# Patient Record
Sex: MALE | Race: WHITE
[De-identification: names, ages, dates, MRNs, and addresses within clinical notes are randomized per-mention and may not be internally consistent; named-entity substitution may affect disease eponyms.]

---

## 2019-01-31 ENCOUNTER — HOSPITAL ENCOUNTER (EMERGENCY)
Dept: HOSPITAL 97 - ER | Age: 34
Discharge: HOME | End: 2019-01-31
Payer: COMMERCIAL

## 2019-01-31 DIAGNOSIS — F17.210: ICD-10-CM

## 2019-01-31 DIAGNOSIS — S39.012A: Primary | ICD-10-CM

## 2019-01-31 PROCEDURE — 99283 EMERGENCY DEPT VISIT LOW MDM: CPT

## 2019-01-31 NOTE — EDPHYS
Physician Documentation                                                                           

 [facility]                                                                

Name: Berto Farley                                                                               

Age: 33 yrs                                                                                       

Sex: Male                                                                                         

: 1985                                                                                   

MRN: [id number]                                                                                   

Arrival Date: 2019                                                                          

Time: 18:42                                                                                       

Account#: [id number]                                                                            

Bed 15                                                                                            

Private MD: None, None                                                                            

ED Physician Jameson Haro                                                                       

HPI:                                                                                              

                                                                                             

20:04 This 33 yrs old  Male presents to ER via Ambulatory with complaints of Back    jr8 

      Pain.                                                                                       

20:04 The patient presents with pain that is chronic. The symptoms are located in the low     jr8 

      back. Onset: The symptoms/episode began/occurred gradually, 1 week(s) ago, and became       

      worse and became persistent. The pain does not radiate. Associated signs and symptoms:      

      The patient has no apparent associated signs or symptoms. Modifying factors: The            

      patient symptoms are alleviated by rest, the patient symptoms are aggravated by             

      bending, lifting, movement. Severity of symptoms: At their worst the symptoms were          

      moderate, in the emergency department the symptoms are unchanged. The patient has           

      experienced similar episodes in the past, a few times. The patient has not recently         

      seen a physician. History of Chronic back pain. Has occasional flare up's. Stated that      

      he had been bending a lot for work and started to have a flare up. Has not had any          

      relief with OTC medicine. Denies numbness, tingling, weakness, abdominal pain, bowel or     

      bladder dysfunction .                                                                       

                                                                                                  

Historical:                                                                                       

- Allergies:                                                                                      

19:12 No Known Allergies;                                                                     ph  

- Home Meds:                                                                                      

19:12 None [Active];                                                                          ph  

- PMHx:                                                                                           

19:12 None;                                                                                   ph  

- PSHx:                                                                                           

19:12 None;                                                                                   ph  

                                                                                                  

- Immunization history:: Adult Immunizations not up to date.                                      

- Social history:: Smoking status: Patient uses tobacco products, smokes one pack                 

  cigarettes per day.                                                                             

- Ebola Screening: : No symptoms or risks identified at this time.                                

                                                                                                  

                                                                                                  

ROS:                                                                                              

20:04 Eyes: Negative for injury, pain, redness, and discharge, ENT: Negative for injury,      jr8 

      pain, and discharge, Neck: Negative for injury, pain, and swelling, Cardiovascular:         

      Negative for chest pain, palpitations, and edema, Respiratory: Negative for shortness       

      of breath, cough, wheezing, and pleuritic chest pain, Abdomen/GI: Negative for              

      abdominal pain, nausea, vomiting, diarrhea, and constipation, MS/Extremity: Negative        

      for injury and deformity, Skin: Negative for injury, rash, and discoloration, Neuro:        

      Negative for headache, weakness, numbness, tingling, and seizure.                           

20:04 Back: Positive for pain at rest, pain with movement, of the low back area, Negative for     

      radiated pain.                                                                              

                                                                                                  

Exam:                                                                                             

20:04 Eyes:  Pupils equal round and reactive to light, extra-ocular motions intact.  Lids and jr8 

      lashes normal.  Conjunctiva and sclera are non-icteric and not injected.  Cornea within     

      normal limits.  Periorbital areas with no swelling, redness, or edema. ENT:  Nares          

      patent. No nasal discharge, no septal abnormalities noted.  Tympanic membranes are          

      normal and external auditory canals are clear.  Oropharynx with no redness, swelling,       

      or masses, exudates, or evidence of obstruction, uvula midline.  Mucous membranes           

      moist. Neck:  Trachea midline, no thyromegaly or masses palpated, and no cervical           

      lymphadenopathy.  Supple, full range of motion without nuchal rigidity, or vertebral        

      point tenderness.  No Meningismus. Cardiovascular:  Regular rate and rhythm with a          

      normal S1 and S2.  No gallops, murmurs, or rubs.  Normal PMI, no JVD.  No pulse             

      deficits. Respiratory:  Lungs have equal breath sounds bilaterally, clear to                

      auscultation and percussion.  No rales, rhonchi or wheezes noted.  No increased work of     

      breathing, no retractions or nasal flaring. Abdomen/GI:  Soft, non-tender, with normal      

      bowel sounds.  No distension or tympany.  No guarding or rebound.  No evidence of           

      tenderness throughout. Skin:  Warm, dry with normal turgor.  Normal color with no           

      rashes, no lesions, and no evidence of cellulitis. MS/ Extremity:  Pulses equal, no         

      cyanosis.  Neurovascular intact.  Full, normal range of motion. Neuro:  Awake and           

      alert, GCS 15, oriented to person, place, time, and situation.  Cranial nerves II-XII       

      grossly intact.  Motor strength 5/5 in all extremities.  Sensory grossly intact.            

      Cerebellar exam normal.  Normal gait.                                                       

20:04 Back: pain, that is moderate, of the  low back area, ROM is painful, normal spinal          

      alignment noted, CVA tenderness, is absent, muscle spasm, is appreciated in the left        

      low back and right low back.                                                                

                                                                                                  

Vital Signs:                                                                                      

19:12  / 92; Pulse 110; Resp 18; Temp 98.4; Pulse Ox 96% on R/A; Weight 117.93 kg;      ph  

      Height 5 ft. 11 in. (180.34 cm); Pain 8/10;                                                 

20:00  / 89; Pulse 106; Resp 19 S; Pulse Ox 96% on R/A;                                 cc3 

19:12 Body Mass Index 36.26 (117.93 kg, 180.34 cm)                                            ph  

                                                                                                  

MDM:                                                                                              

19:58 Patient medically screened.                                                             jr8 

19:58 Data reviewed: vital signs, nurses notes, and as a result, I will discharge patient.    jr8 

      Data interpreted: Pulse oximetry: on room air is 96 %. Interpretation: normal.              

      Counseling: I had a detailed discussion with the patient and/or guardian regarding: the     

      historical points, exam findings, and any diagnostic results supporting the                 

      discharge/admit diagnosis, the need for outpatient follow up, a family practitioner, to     

      return to the emergency department if symptoms worsen or persist or if there are any        

      questions or concerns that arise at home.                                                   

                                                                                                  

Administered Medications:                                                                         

No medications were administered                                                                  

                                                                                                  

                                                                                                  

Disposition:                                                                                      

                                                                                             

06:43 Co-signature as Attending Physician, Jameson Haro MD I agree with the assessment and   kdr 

      plan of care.                                                                               

                                                                                                  

Disposition:                                                                                      

19 19:59 Discharged to Home. Impression: Low back pain, Strain of muscle, fascia and        

  tendon of lower back.                                                                           

- Condition is Stable.                                                                            

- Discharge Instructions: Back Pain, Adult, Chronic Back Pain, Musculoskeletal Pain,              

  Back Exercises, Easy-to-Read, Heat Therapy.                                                     

- Prescriptions for Ibuprofen 800 mg Oral Tablet - take 1 tablet by ORAL route every 12           

  hours As needed take with food; 20 tablet. Cyclobenzaprine 10 mg Oral Tablet - take 1           

  tablet by ORAL route every 8 hours As needed; 30 tablet. Medrol (Asm) 4 mg Oral                 

  Tablets, Dose Pack - take 1 tablet by ORAL route as directed - follow package                   

  instructions; 1 packet.                                                                         

- Work release form, Medication Reconciliation Form, Thank You Letter, Antibiotic                 

  Education, Prescription Opioid Use form.                                                        

- Follow up: Private Physician; When: 5 - 6 days; Reason: Recheck today's complaints,             

  Continuance of care, Re-evaluation by your physician.                                           

- Problem is new.                                                                                 

- Symptoms have improved.                                                                         

                                                                                                  

                                                                                                  

                                                                                                  

Signatures:                                                                                       

Jameson Haro MD MD   [facility]                                                  

Shahid Amado PA PA   jr8                                                  

Magy Barrera RN                      RN   ph                                                   

Jody Johnston                             cc3                                                  

                                                                                                  

Corrections: (The following items were deleted from the chart)                                    

                                                                                             

20:16 19:59 2019 19:59 Discharged to Home. Impression: Low back pain; Strain of muscle, cc3 

      fascia and tendon of lower back. Condition is Stable. Forms are Medication                  

      Reconciliation Form, Thank You Letter, Antibiotic Education, Prescription Opioid Use.       

      Follow up: Private Physician; When: 5 - 6 days; Reason: Recheck today's complaints,         

      Continuance of care, Re-evaluation by your physician. Problem is new. Symptoms have         

      improved. jr8                                                                               

                                                                                                  

**************************************************************************************************

## 2022-12-12 ENCOUNTER — HOSPITAL ENCOUNTER (EMERGENCY)
Dept: HOSPITAL 97 - ER | Age: 37
Discharge: HOME | End: 2022-12-12
Payer: COMMERCIAL

## 2022-12-12 VITALS — TEMPERATURE: 98.6 F

## 2022-12-12 VITALS — OXYGEN SATURATION: 100 % | DIASTOLIC BLOOD PRESSURE: 89 MMHG | SYSTOLIC BLOOD PRESSURE: 156 MMHG

## 2022-12-12 DIAGNOSIS — M54.12: Primary | ICD-10-CM

## 2022-12-12 DIAGNOSIS — F17.210: ICD-10-CM

## 2022-12-12 LAB
BUN BLD-MCNC: 11 MG/DL (ref 7–18)
GLUCOSE SERPLBLD-MCNC: 107 MG/DL (ref 74–106)
HCT VFR BLD CALC: 38.5 % (ref 39.6–49)
LYMPHOCYTES # SPEC AUTO: 2.3 K/UL (ref 0.7–4.9)
MCV RBC: 88.1 FL (ref 80–100)
PMV BLD: 8 FL (ref 7.6–11.3)
POTASSIUM SERPL-SCNC: 3.8 MMOL/L (ref 3.5–5.1)
RBC # BLD: 4.37 M/UL (ref 4.33–5.43)
TROPONIN I SERPL HS-MCNC: 10.2 PG/ML (ref ?–58.9)

## 2022-12-12 PROCEDURE — 96374 THER/PROPH/DIAG INJ IV PUSH: CPT

## 2022-12-12 PROCEDURE — 93005 ELECTROCARDIOGRAM TRACING: CPT

## 2022-12-12 PROCEDURE — 74175 CTA ABDOMEN W/CONTRAST: CPT

## 2022-12-12 PROCEDURE — 71045 X-RAY EXAM CHEST 1 VIEW: CPT

## 2022-12-12 PROCEDURE — 99285 EMERGENCY DEPT VISIT HI MDM: CPT

## 2022-12-12 PROCEDURE — 96375 TX/PRO/DX INJ NEW DRUG ADDON: CPT

## 2022-12-12 PROCEDURE — 71275 CT ANGIOGRAPHY CHEST: CPT

## 2022-12-12 PROCEDURE — 85025 COMPLETE CBC W/AUTO DIFF WBC: CPT

## 2022-12-12 PROCEDURE — 84484 ASSAY OF TROPONIN QUANT: CPT

## 2022-12-12 PROCEDURE — 36415 COLL VENOUS BLD VENIPUNCTURE: CPT

## 2022-12-12 PROCEDURE — 80048 BASIC METABOLIC PNL TOTAL CA: CPT

## 2022-12-12 NOTE — RAD REPORT
EXAM DESCRIPTION:  RAD - Chest Single View - 12/12/2022 7:08 pm

 

CLINICAL HISTORY:  CHEST PAIN

 

COMPARISON:  CHEST PA AND LAT 2 VIEW dated 12/8/2009

 

FINDINGS:  Lines: None.

Lungs: No evidence of edema or pneumonia.

Pleural: No significant pleural effusions or pneumothorax.

Cardiac: The heart size is within normal limits.

Mediastinum: Within normal limits.

Bones: No acute fractures.

Other: None

 

IMPRESSION:  No acute cardiopulmonary disease.

## 2022-12-12 NOTE — ER
Nurse's Notes                                                                                     

 [facility]                                                                 

Name: Berto Farley                                                                               

Age: 37 yrs                                                                                       

Sex: Male                                                                                         

: 1985                                                                                   

MRN: [id number]                                                                                   

Arrival Date: 2022                                                                          

Time: 18:44                                                                                       

Account#: [id number]                                                                            

Bed IW1                                                                                           

Private MD:                                                                                       

Diagnosis: Chest pain, unspecified;Radiculopathy, cervical region                                 

                                                                                                  

Presentation:                                                                                     

                                                                                             

18:45 Chief complaint: Patient states: Woke up today - chest pain radiates to left arm.       ld1 

      Severe pain - took ibuprofen and acetaminophen this morning. Coronavirus screen: At         

      this time, the client does not indicate any symptoms associated with coronavirus-19.        

      Ebola Screen: No symptoms or risks identified at this time. Initial Sepsis Screen: Does     

      the patient meet any 2 criteria? No. Patient's initial sepsis screen is negative. Does      

      the patient have a suspected source of infection? No. Patient's initial sepsis screen       

      is negative. Risk Assessment: Do you want to hurt yourself or someone else? Patient         

      reports no desire to harm self or others. Onset of symptoms was 2022 at        

      18:46.                                                                                      

18:45 Method Of Arrival: Ambulatory                                                           ld1 

18:45 Acuity: LEWIS 3                                                                           ld1 

                                                                                                  

Triage Assessment:                                                                                

18:46 General: Appears in no apparent distress. uncomfortable, Behavior is calm, cooperative, ld1 

      appropriate for age. Pain: Complains of pain in left clavicle and anterior aspect of        

      left upper chest Pain radiates to left arm Pain currently is 10 out of 10 on a pain         

      scale. Quality of pain is described as sharp, shooting, throbbing. EENT: No signs           

      and/or symptoms were reported regarding the EENT system. Neuro: Level of Consciousness      

      is awake, alert, obeys commands, Oriented to person, place, time, situation.                

      Cardiovascular: Capillary refill < 3 seconds Patient's skin is warm and dry. Rhythm is      

      sinus rhythm. Respiratory: Airway is patent Respiratory effort is even, unlabored. GI:      

      Abdomen is round non-distended. : No signs and/or symptoms were reported regarding        

      the genitourinary system. Derm: No signs and/or symptoms reported regarding the             

      dermatologic system. Musculoskeletal: No signs and/or symptoms reported regarding the       

      musculoskeletal system.                                                                     

                                                                                                  

Historical:                                                                                       

- Allergies:                                                                                      

18:46 No Known Allergies;                                                                     ld1 

- Home Meds:                                                                                      

18:46 None [Active];                                                                          ld1 

- PMHx:                                                                                           

18:46 None;                                                                                   ld1 

- PSHx:                                                                                           

18:46 None;                                                                                   ld1 

                                                                                                  

- Immunization history:: Adult Immunizations up to date, Client reports receiving the             

  2nd dose of the Covid vaccine.                                                                  

- Social history:: Smoking status: Patient reports the use of cigarette tobacco                   

  products, smokes one-half pack cigarettes per day, Patient/guardian denies using                

  alcohol.                                                                                        

- Family history:: not pertinent.                                                                 

- Hospitalizations: : No recent hospitalization is reported.                                      

                                                                                                  

                                                                                                  

Screenin:56 Abuse screen: Denies threats or abuse. Denies injuries from another. Nutritional        ld1 

      screening: No deficits noted. Tuberculosis screening: No symptoms or risk factors           

      identified. Fall Risk No fall in past 12 months (0 pts).                                    

                                                                                                  

Assessment:                                                                                       

21:56 Reassessment: Patient appears in no apparent distress at this time. No changes from     ld1 

      previously documented assessment. Patient and/or family updated on plan of care and         

      expected duration. Pain level reassessed. Patient is alert, oriented x 3, equal             

      unlabored respirations, skin warm/dry/pink. See triage assessment.                          

                                                                                                  

Vital Signs:                                                                                      

18:45 Weight 124.74 kg; Height 5 ft. 11 in. (180.34 cm); Pain 10/10;                          ld1 

18:48  / 93; Pulse 82; Resp 18; Temp 98.6(O); Pulse Ox 98% on R/A;                      ld1 

21:56  / 89; Pulse 79; Resp 18; Pulse Ox 100% on R/A; Pain 3/10;                        ld1 

18:45 Body Mass Index 38.35 (124.74 kg, 180.34 cm)                                            ld1 

                                                                                                  

ED Course:                                                                                        

18:44 Patient arrived in ED.                                                                  rg4 

18:46 Triage completed.                                                                       ld1 

18:46 Arm band placed on right wrist. EKG completed in triage. Results shown to MD.           ld1 

19:10 XRAY Chest (1 view) In Process Unspecified.                                             EDMS

19:12 Samm Evangelista MD is Attending Physician.                                                rn  

19:31 Inserted saline lock: 20 gauge in right antecubital area, using aseptic technique.      db  

      Blood collected.                                                                            

20:58 CT Aorta for Dissection In Process Unspecified.                                         EDMS

21:56 Patient has correct armband on for positive identification. Call light in reach. Pulse  ld1 

      ox on. NIBP on. Door closed. Noise minimized.                                               

21:56 No provider procedures requiring assistance completed. IV discontinued, intact,         ld1 

      bleeding controlled, No redness/swelling at site. Patient maintains SpO2 saturation         

      greater than 95% on room air.                                                               

                                                                                                  

Administered Medications:                                                                         

21:55 Drug: Ketorolac 30 mg Route: IVP; Site: right antecubital;                              ld1 

21:56 Drug: Decadron - Dexamethasone 10 mg Route: IVP; Site: right antecubital;               ld1 

                                                                                                  

                                                                                                  

Medication:                                                                                       

21:56 VIS not applicable for this client.                                                     ld1 

                                                                                                  

Outcome:                                                                                          

21:37 Discharge ordered by MD.                                                                rn  

21:56 Discharged to home ambulatory, with family.                                             ld1 

21:56 Condition: stable                                                                           

21:56 Discharge instructions given to patient, family, Instructed on discharge instructions,      

      follow up and referral plans. medication usage, Demonstrated understanding of               

      instructions, follow-up care, medications, Prescriptions given X 3.                         

21:57 Patient left the ED.                                                                    ld1 

                                                                                                  

Signatures:                                                                                       

Dispatcher [employer]                           EDMS                                                 

Samm Evangelista MD MD rn Garcia, Rubi                                 rg4                                                  

Kyra Mcginnis RN                     RN   ld1                                                  

Justine Travis RN                    RN   db                                                   

                                                                                                  

**************************************************************************************************

## 2022-12-12 NOTE — EDPHYS
Physician Documentation                                                                           

 [facility]                                                                 

Name: Berto Farley                                                                               

Age: 37 yrs                                                                                       

Sex: Male                                                                                         

: 1985                                                                                   

MRN: [id number]                                                                                   

Arrival Date: 2022                                                                          

Time: 18:44                                                                                       

Account#: [id number]                                                                            

Bed IW1                                                                                           

Private MD:                                                                                       

ED Physician Samm Evangelista                                                                         

HPI:                                                                                              

                                                                                             

19:29 This 37 yrs old Black Male presents to ER via Ambulatory with complaints of Chest Pain, rn  

      Shoulder Pain.                                                                              

19:29 The patient or guardian reports chest pain that is located primarily in the anterior    rn  

      chest wall, left. The pain radiates to the left shoulder, the left scapula. Associated      

      signs and symptoms: Pertinent positives: None. Pertinent negatives: abdominal pain,         

      cough, diaphoresis, dizziness, headache, near syncope, palpitations, shortness of           

      breath, syncope, vomiting. The chest pain is described as aching. Duration: The patient     

      or guardian reports multiple episodes, that are intermittent. Modifying factors: The        

      symptoms are alleviated by NSAIDS, remaining still, the symptoms are aggravated by          

      movement, palpation of area. Severity of pain: At its worst the pain was moderate in        

      the emergency department the pain has improved. The patient has not experienced similar     

      symptoms in the past. The patient has not recently seen a physician. Pt reports left        

      chest pain, radiates to left shoulder and scapula. No fever. No trauma. No cough.           

      Reports improved with OTC meds. Present for last couple of days. No famhx of cardiac        

      problems at his age. NO abd pain. No vomiting. .                                            

                                                                                                  

Historical:                                                                                       

- Allergies:                                                                                      

18:46 No Known Allergies;                                                                     ld1 

- Home Meds:                                                                                      

18:46 None [Active];                                                                          ld1 

- PMHx:                                                                                           

18:46 None;                                                                                   ld1 

- PSHx:                                                                                           

18:46 None;                                                                                   ld1 

                                                                                                  

- Immunization history:: Adult Immunizations up to date, Client reports receiving the             

  2nd dose of the Covid vaccine.                                                                  

- Social history:: Smoking status: Patient reports the use of cigarette tobacco                   

  products, smokes one-half pack cigarettes per day, Patient/guardian denies using                

  alcohol.                                                                                        

- Family history:: not pertinent.                                                                 

- Hospitalizations: : No recent hospitalization is reported.                                      

                                                                                                  

                                                                                                  

ROS:                                                                                              

19:29 Constitutional: Negative for fever, chills, and weight loss, Eyes: Negative for injury, rn  

      pain, redness, and discharge, Neck: Negative for injury, pain, and swelling,                

      Cardiovascular: + chest pain Respiratory: Negative for shortness of breath, cough,          

      wheezing, and pleuritic chest pain, Abdomen/GI: Negative for abdominal pain, nausea,        

      vomiting, diarrhea, and constipation, Back: Negative for injury and pain, MS/Extremity:     

      Negative for injury and deformity, Skin: Negative for injury, rash, and discoloration,      

      Neuro: Negative for headache, weakness, numbness, tingling, and seizure.                    

                                                                                                  

Exam:                                                                                             

19:29 Constitutional:  This is a well developed, well nourished patient who is awake, alert,  rn  

      and in no acute distress. Head/Face:  Normocephalic, atraumatic. Neck:  Trachea             

      midline, no masses palpated, and no cervical lymphadenopathy.  Supple, full range of        

      motion without nuchal rigidity, or vertebral point tenderness.  No Meningismus.             

      Chest/axilla:  Normal chest wall appearance and motion.  + reproducible left chest wall     

      pain Cardiovascular:  Regular rate and rhythm.  No pulse deficits. Respiratory:  No         

      increased work of breathing, no retractions or nasal flaring. Abdomen/GI:  Soft,            

      non-tender Skin:  Warm, dry MS/ Extremity:  Pulses equal, no cyanosis Neuro:  Awake and     

      alert, GCS 15                                                                               

19:42 ECG was reviewed by the Attending Physician.                                            rn  

                                                                                                  

Vital Signs:                                                                                      

18:45 Weight 124.74 kg; Height 5 ft. 11 in. (180.34 cm); Pain 10/10;                          ld1 

18:48  / 93; Pulse 82; Resp 18; Temp 98.6(O); Pulse Ox 98% on R/A;                      ld1 

21:56  / 89; Pulse 79; Resp 18; Pulse Ox 100% on R/A; Pain 3/10;                        ld1 

18:45 Body Mass Index 38.35 (124.74 kg, 180.34 cm)                                            ld1 

                                                                                                  

MDM:                                                                                              

19:12 Patient medically screened.                                                             rn  

21:36 Differential diagnosis: acute myocardial infarction, acute pericarditis, anxiety,       rn  

      coronary artery disease chest wall pain, costochondritis, esophagitis, gastroesophageal     

      reflux disease (GERD), pleurisy, pneumothorax, thoracic aortic disection. HEART Score:      

      History: Slightly Suspicious (0), ECG: Normal (0), Age: < or = 45 years (0), Risk           

      Factors: No Risk Factors Known (0), Troponin: < or = 1 x Normal Limit (0), Total Score      

      = 0. Data reviewed: vital signs, nurses notes, lab test result(s), EKG, radiologic          

      studies, CT scan, and as a result, I will discharge patient. Counseling: I had a            

      detailed discussion with the patient and/or guardian regarding: the historical points,      

      exam findings, and any diagnostic results supporting the discharge/admit diagnosis, lab     

      results, radiology results, the need for outpatient follow up, to return to the             

      emergency department if symptoms worsen or persist or if there are any questions or         

      concerns that arise at home. Special discussion: I discussed with the patient/guardian      

      in detail that at this point there is no indication for admission to the hospital. It       

      is understood, however, that if the symptoms persist or worsen the patient needs to         

      return immediately for re-evaluation. Based on the history and exam findings, there is      

      no indication for further emergent testing or inpatient evaluation. I discussed with        

      the patient/guardian the need to see the primary care provider for further evaluation       

      of the symptoms.                                                                            

                                                                                                  

                                                                                             

18:52 Order name: Basic Metabolic Panel; Complete Time: 20:35                                 ld1 

                                                                                             

18:52 Order name: CBC with Diff; Complete Time: 20:35                                         ld1 

                                                                                             

18:52 Order name: Troponin HS; Complete Time: 20:35                                           ld1 

                                                                                             

18:52 Order name: XRAY Chest (1 view); Complete Time: 19:18                                   ld1 

                                                                                             

19:21 Order name: CT Aorta for Dissection; Complete Time: 21:36                               rn  

                                                                                             

18:52 Order name: EKG; Complete Time: 18:53                                                   ld1 

                                                                                             

18:52 Order name: EKG - Nurse/Tech; Complete Time: 18:52                                      ld1 

                                                                                             

18:52 Order name: IV Saline Lock; Complete Time: 19:31                                        ld1 

                                                                                             

18:52 Order name: Labs collected and sent; Complete Time: 19:31                               ld1 

                                                                                             

18:52 Order name: O2 Per Protocol; Complete Time: 20:42                                       ld1 

                                                                                             

18:52 Order name: O2 Sat Monitoring; Complete Time: 20:42                                     ld1 

                                                                                                  

EC:42 Rate is 78 beats/min. Rhythm is regular. QRS Axis is Normal. LA interval is normal. QRS rn  

      interval is normal. QT interval is normal. No Q waves. T waves are Normal. No ST            

      changes noted. Clinical impression: NSR w/ Non-specific ST/T Changes. Interpreted by        

      me. Reviewed by me.                                                                         

                                                                                                  

Administered Medications:                                                                         

21:55 Drug: Ketorolac 30 mg Route: IVP; Site: right antecubital;                              ld1 

21:56 Drug: Decadron - Dexamethasone 10 mg Route: IVP; Site: right antecubital;               ld1 

                                                                                                  

                                                                                                  

Disposition Summary:                                                                              

22 21:37                                                                                    

Discharge Ordered                                                                                 

      Location: Home                                                                          rn  

      Problem: new                                                                            rn  

      Symptoms: have improved                                                                 rn  

      Condition: Stable                                                                       rn  

      Diagnosis                                                                                   

        - Chest pain, unspecified                                                             rn  

        - Radiculopathy, cervical region                                                      rn  

      Followup:                                                                               rn  

        - With: Private Physician                                                                  

        - When: As needed                                                                          

        - Reason: Recheck today's complaints, Re-evaluation by your physician                      

      Discharge Instructions:                                                                     

        - Discharge Summary Sheet                                                             rn  

        - Cervical Radiculopathy                                                              rn  

        - Nonspecific Chest Pain, Adult                                                       rn  

        - Pain Without a Known Cause                                                          rn  

        - Steps to Quit Smoking                                                               rn  

      Forms:                                                                                      

        - Medication Reconciliation Form                                                      rn  

        - Thank You Letter                                                                    rn  

        - Antibiotic Education                                                                rn  

        - Prescription Opioid Use                                                             rn  

      Prescriptions:                                                                              

        - Cyclobenzaprine 10 mg Oral Tablet                                                        

            - take 1 tablet by ORAL route every 8 hours As needed; 15 tablet; Refills: 0,     rn  

      Product Selection Permitted                                                                 

        - Tramadol 50 mg Oral Tablet                                                               

            - take 1 tablet by ORAL route every 8 hours as needed; 12 tablet; Refills: 0,     rn  

      Product Selection Permitted                                                                 

        - Medrol (Sam) 4 mg Oral Tablets, Dose Pack                                                

            - take 1 tablet by ORAL route as directed - follow package instructions; 1        rn  

      packet; Refills: 0, Product Selection Permitted                                             

Signatures:                                                                                       

Dispatcher [employer]                           Samm Wilson MD MD   rn                                                   

Kyra Mcginnis RN                     RN   ld1                                                  

                                                                                                  

**************************************************************************************************

## 2022-12-12 NOTE — RAD REPORT
EXAM DESCRIPTION:  CTAngio  Aorta For Dissection - 12/12/2022 8:56 pm

 

CLINICAL HISTORY:

chest pain radiating to left arm/neck/scapula

 

COMPARISON:  No comparisonsNo comparisons

 

TECHNIQUE:  CT of the chest, abdomen, and pelvis was performed with IV contrast.

 

All CT scans are performed using dose optimization technique as appropriate and may include automated
 exposure control or mA/KV adjustment according to patient size.

 

FINDINGS:  Thorax:

Chest Wall: No abnormal mass

Lungs: No acute abnormality.

Pleura: No effusions or pneumothorax.

Taina/Mediastinum: No lymphadenopathy. Small hiatal hernia

Aorta/Pulmonary Arteries: Unremarkable

Heart: Normal size.

 

Abdomen/Pelvis:

Liver: No acute abnormality or suspicious lesions.

Biliary: No biliary ductal dilatation.

Stomach: No significant focal abnormality.

Duodenum: No significant focal abnormality.

Pancreas: No significant abnormality.

Spleen: No significant abnormality.

Adrenal: No suspicious lesions.

Kidney/ureter: No hydronephrosis. No renal calculi.

Retroperitoneum: No retroperitoneal adenopathy.

Vascular: No aneurysm.

Bowel: No significant focal abnormality.

Peritoneum: No ascites or free air.

Bladder: Grossly unremarkable.

Reproductive: No adnexal masses.

 

Bones: No acute fracture.

Other: n/a

 

IMPRESSION:  No aortic aneurysm, aortic dissection, or pulmonary embolus identified. No alternate acu
te finding identified within the chest, abdomen, or pelvis.

## 2022-12-13 NOTE — EKG
Test Date:    2022-12-12               Test Time:    18:48:39

Technician:   MERLY                                    

                                                     

MEASUREMENT RESULTS:                                       

Intervals:                                           

Rate:         76                                     

WI:           120                                    

QRSD:         94                                     

QT:           370                                    

QTc:          416                                    

Axis:                                                

P:            50                                     

WI:           120                                    

QRS:          43                                     

T:            8                                      

                                                     

INTERPRETIVE STATEMENTS:                                       

                                                     

Normal sinus rhythm

Possible Left atrial enlargement

Borderline ECG

No previous ECG available for comparison



Electronically Signed On 12-13-22 14:34:26 CST by Bala Holden